# Patient Record
Sex: FEMALE | Race: WHITE | URBAN - METROPOLITAN AREA
[De-identification: names, ages, dates, MRNs, and addresses within clinical notes are randomized per-mention and may not be internally consistent; named-entity substitution may affect disease eponyms.]

---

## 2018-01-05 ENCOUNTER — IMPORTED ENCOUNTER (OUTPATIENT)
Dept: URBAN - METROPOLITAN AREA CLINIC 50 | Facility: CLINIC | Age: 73
End: 2018-01-05

## 2019-05-03 ENCOUNTER — IMPORTED ENCOUNTER (OUTPATIENT)
Dept: URBAN - METROPOLITAN AREA CLINIC 50 | Facility: CLINIC | Age: 74
End: 2019-05-03

## 2019-05-03 NOTE — PATIENT DISCUSSION
"""Discussed ocular health
"""Follow ERM w/o surgery. Call if vision decreases or distortion increases.  """
"""Follow dry ARMD without treatment. MVI/AREDS/Tracie.  Patient to call if vision changes or ""
"""PCO(s) are not visually significant for capsulotomy.  Monitor by regular examinations. """
To get better and follow your care plan as instructed.

## 2021-04-18 ASSESSMENT — TONOMETRY
OS_IOP_MMHG: 12
OS_IOP_MMHG: 12
OD_IOP_MMHG: 13
OD_IOP_MMHG: 12

## 2021-04-18 ASSESSMENT — VISUAL ACUITY
OD_CC: 20/30-1
OS_OTHER: 20/60-. 20/70-.
OD_BAT: 20/40
OD_PH: 20/30-
OD_SC: 20/40
OD_BAT: 20/50-
OD_OTHER: 20/50-. 20/80-.
OS_CC: 20/30-2
OS_BAT: 20/50
OD_CC: J3-
OS_SC: 20/40-
OD_OTHER: 20/40. 20/400.
OS_OTHER: 20/50. 20/400.
OS_BAT: 20/60-
OS_CC: J3-

## 2021-06-08 ENCOUNTER — APPOINTMENT (RX ONLY)
Dept: URBAN - METROPOLITAN AREA CLINIC 86 | Facility: CLINIC | Age: 76
Setting detail: DERMATOLOGY
End: 2021-06-08

## 2021-06-08 DIAGNOSIS — R20.2 PARESTHESIA OF SKIN: ICD-10-CM

## 2021-06-08 DIAGNOSIS — L29.8 OTHER PRURITUS: ICD-10-CM

## 2021-06-08 DIAGNOSIS — L29.89 OTHER PRURITUS: ICD-10-CM

## 2021-06-08 PROCEDURE — ? DEFER

## 2021-06-08 PROCEDURE — ? ADDITIONAL NOTES

## 2021-06-08 PROCEDURE — 99203 OFFICE O/P NEW LOW 30 MIN: CPT

## 2021-06-08 PROCEDURE — ? COUNSELING

## 2021-06-08 ASSESSMENT — LOCATION ZONE DERM: LOCATION ZONE: LEG

## 2021-06-08 ASSESSMENT — LOCATION SIMPLE DESCRIPTION DERM
LOCATION SIMPLE: LEFT PRETIBIAL REGION
LOCATION SIMPLE: RIGHT PRETIBIAL REGION

## 2021-06-08 ASSESSMENT — LOCATION DETAILED DESCRIPTION DERM
LOCATION DETAILED: LEFT PROXIMAL PRETIBIAL REGION
LOCATION DETAILED: RIGHT PROXIMAL PRETIBIAL REGION

## 2021-06-08 NOTE — HPI: ITCHING
How Did Your Itching Occur?: sudden in onset (over a period of weeks to a few months)
How Severe Is Your Itching?: mild
Additional History: Describes aching in legs

## 2021-06-08 NOTE — PROCEDURE: ADDITIONAL NOTES
Detail Level: Simple
Render Risk Assessment In Note?: no
Additional Notes: Discussed with patient that there are no skin findings consistent with her abnormal sensations in the skin\\nPatient has history of recent spine surgery\\nEncouraged follow up with spine surgeon, discussed with patient

## 2021-08-27 ENCOUNTER — PROBLEM (OUTPATIENT)
Dept: URBAN - METROPOLITAN AREA CLINIC 53 | Facility: CLINIC | Age: 76
End: 2021-08-27

## 2021-08-27 DIAGNOSIS — H00.025: ICD-10-CM

## 2021-08-27 DIAGNOSIS — H02.055: ICD-10-CM

## 2021-08-27 PROCEDURE — 67820 REVISE EYELASHES: CPT

## 2021-08-27 PROCEDURE — 92012 INTRM OPH EXAM EST PATIENT: CPT

## 2021-08-27 RX ORDER — TOBRAMYCIN AND DEXAMETHASONE 1; 3 MG/ML; MG/ML: 1 SUSPENSION/ DROPS OPHTHALMIC

## 2021-08-27 RX ORDER — CEPHALEXIN 500 MG/1: 1 CAPSULE ORAL TWICE A DAY

## 2021-08-27 ASSESSMENT — TONOMETRY
OD_IOP_MMHG: 15
OS_IOP_MMHG: 15

## 2021-08-27 ASSESSMENT — VISUAL ACUITY
OS_SC: 20/25
OD_SC: 20/25

## 2021-08-27 NOTE — PATIENT DISCUSSION
Discussed that if she is bothered by the nodule that may be left behind once this resolves we can refer her to an ophthalmologist to have it excised and removed.

## 2021-08-27 NOTE — PATIENT DISCUSSION
Large internal hordeolum on the left lower lid. Likely secondary to the trichiasis/in grown eye lash. Explained to her how that infected the gland. Will start her on Keflex 500mg twice a day for 10 days and Tobradex four times a day in the left eye for 7 days. Recommended warm compress (heat mask) twice a day if possible for 10-15 minutes. Recommended she throw away any make up that has touched her eye.

## 2021-10-15 ENCOUNTER — PROBLEM (OUTPATIENT)
Dept: URBAN - METROPOLITAN AREA CLINIC 50 | Facility: CLINIC | Age: 76
End: 2021-10-15

## 2021-10-15 DIAGNOSIS — H02.055: ICD-10-CM

## 2021-10-15 DIAGNOSIS — D49.2: ICD-10-CM

## 2021-10-15 PROCEDURE — 92012 INTRM OPH EXAM EST PATIENT: CPT

## 2021-10-15 PROCEDURE — 67820 REVISE EYELASHES: CPT

## 2021-10-15 RX ORDER — NEOMYCIN SULFATE, POLYMYXIN B SULFATE AND DEXAMETHASONE 3.5; 10000; 1 MG/G; [USP'U]/G; MG/G
OINTMENT OPHTHALMIC TWICE A DAY
Start: 2021-10-15 | End: 2021-10-19

## 2021-10-15 ASSESSMENT — VISUAL ACUITY
OS_SC: 20/40-2
OD_SC: 20/25
OU_SC: 20/25
OS_PH: 20/30-2

## 2021-10-15 ASSESSMENT — TONOMETRY
OD_IOP_MMHG: 15
OS_IOP_MMHG: 15

## 2021-10-15 NOTE — PATIENT DISCUSSION
2 cysts left lower lid,  expressed with forceps and patient signed consent.  recommend patient start maxitrol oinment to area at bedtime for 4 days then stop.

## 2021-10-15 NOTE — PROCEDURE NOTE: CLINICAL
PROCEDURE NOTE: Epilation Left Lower Lid. Diagnosis: Trichiasis without Entropion. Consent was obtained prior to the procedure. Patient was brought to slit lamp where trichiasis was/were removed using micro forceps. Patient tolerated procedure well. Mechelle Lu PROCEDURE NOTE: Lid Margin Debridement Risks, benefits and alternatives were discussed with the patient prior to the procedure and included inflammation, bleeding, and the possibility of needing additional procedures. The patient requested that we proceed with the procedure. There were no complications. Mechelle Lu

## 2023-07-21 ENCOUNTER — CONSULTATION/EVALUATION (OUTPATIENT)
Dept: URBAN - METROPOLITAN AREA CLINIC 52 | Facility: CLINIC | Age: 78
End: 2023-07-21

## 2023-07-21 DIAGNOSIS — D48.5: ICD-10-CM

## 2023-07-21 PROCEDURE — 99213 OFFICE O/P EST LOW 20 MIN: CPT

## 2023-07-21 PROCEDURE — 92285 EXTERNAL OCULAR PHOTOGRAPHY: CPT

## 2023-07-21 ASSESSMENT — TONOMETRY
OS_IOP_MMHG: 16
OD_IOP_MMHG: 16

## 2023-07-21 ASSESSMENT — KERATOMETRY
OS_AXISANGLE2_DEGREES: 179
OD_AXISANGLE_DEGREES: 180
OS_K1POWER_DIOPTERS: 44.25
OS_AXISANGLE_DEGREES: 089
OD_AXISANGLE2_DEGREES: 090
OD_K2POWER_DIOPTERS: 45.00
OS_K2POWER_DIOPTERS: 45.25
OD_K1POWER_DIOPTERS: 45.00

## 2023-07-21 ASSESSMENT — VISUAL ACUITY
OS_SC: 20/30
OD_SC: 20/20
OS_PH: 20/25

## 2023-09-20 ENCOUNTER — PRE-OP/H&P (OUTPATIENT)
Dept: URBAN - METROPOLITAN AREA CLINIC 49 | Facility: LOCATION | Age: 78
End: 2023-09-20

## 2023-09-20 DIAGNOSIS — D48.5: ICD-10-CM

## 2023-09-20 PROCEDURE — PREOP PRE OP VISIT

## 2023-09-20 ASSESSMENT — KERATOMETRY
OD_AXISANGLE2_DEGREES: 090
OD_AXISANGLE_DEGREES: 180
OS_AXISANGLE_DEGREES: 089
OS_K2POWER_DIOPTERS: 45.25
OS_K1POWER_DIOPTERS: 44.25
OD_K2POWER_DIOPTERS: 45.00
OS_AXISANGLE2_DEGREES: 179
OD_K1POWER_DIOPTERS: 45.00

## 2023-09-20 ASSESSMENT — VISUAL ACUITY
OD_SC: 20/25-1
OS_SC: 20/25-1

## 2023-09-26 ENCOUNTER — SURGERY/PROCEDURE (OUTPATIENT)
Dept: URBAN - METROPOLITAN AREA SURGERY 16 | Facility: SURGERY | Age: 78
End: 2023-09-26

## 2023-09-26 DIAGNOSIS — D48.5: ICD-10-CM

## 2023-09-26 PROCEDURE — 67840 REMOVE EYELID LESION: CPT

## 2023-09-27 ASSESSMENT — KERATOMETRY
OS_AXISANGLE2_DEGREES: 179
OS_AXISANGLE_DEGREES: 089
OD_K2POWER_DIOPTERS: 45.00
OD_AXISANGLE_DEGREES: 180
OS_K1POWER_DIOPTERS: 44.25
OD_AXISANGLE2_DEGREES: 090
OD_K1POWER_DIOPTERS: 45.00
OS_K2POWER_DIOPTERS: 45.25

## 2024-09-23 ENCOUNTER — COMPREHENSIVE EXAM (OUTPATIENT)
Dept: URBAN - METROPOLITAN AREA CLINIC 52 | Facility: CLINIC | Age: 79
End: 2024-09-23

## 2024-09-23 DIAGNOSIS — H35.3221: ICD-10-CM

## 2024-09-23 DIAGNOSIS — H35.373: ICD-10-CM

## 2024-09-23 DIAGNOSIS — H43.812: ICD-10-CM

## 2024-09-23 DIAGNOSIS — H52.4: ICD-10-CM

## 2024-09-23 DIAGNOSIS — H26.491: ICD-10-CM

## 2024-09-23 DIAGNOSIS — D48.5: ICD-10-CM

## 2024-09-23 PROCEDURE — 99215 OFFICE O/P EST HI 40 MIN: CPT

## 2024-09-23 PROCEDURE — 92134 CPTRZ OPH DX IMG PST SGM RTA: CPT
